# Patient Record
Sex: MALE | Race: WHITE | NOT HISPANIC OR LATINO | Employment: FULL TIME | URBAN - METROPOLITAN AREA
[De-identification: names, ages, dates, MRNs, and addresses within clinical notes are randomized per-mention and may not be internally consistent; named-entity substitution may affect disease eponyms.]

---

## 2022-05-09 ENCOUNTER — OCCUPATIONAL HEALTH (OUTPATIENT)
Dept: URGENT CARE | Facility: CLINIC | Age: 57
End: 2022-05-09

## 2022-05-09 DIAGNOSIS — Z13.9 ENCOUNTER FOR SCREENING: Primary | ICD-10-CM

## 2022-05-09 PROCEDURE — 80305 MEDTOX HAIR COLLECTION ONLY: ICD-10-PCS | Mod: S$GLB,,, | Performed by: PHYSICIAN ASSISTANT

## 2022-05-09 PROCEDURE — 80305 DRUG TEST PRSMV DIR OPT OBS: CPT | Mod: S$GLB,,, | Performed by: PHYSICIAN ASSISTANT

## 2022-12-20 ENCOUNTER — OFFICE VISIT (OUTPATIENT)
Dept: URGENT CARE | Facility: CLINIC | Age: 57
End: 2022-12-20
Payer: OTHER MISCELLANEOUS

## 2022-12-20 VITALS
DIASTOLIC BLOOD PRESSURE: 76 MMHG | WEIGHT: 205 LBS | TEMPERATURE: 99 F | OXYGEN SATURATION: 95 % | BODY MASS INDEX: 29.35 KG/M2 | HEIGHT: 70 IN | HEART RATE: 72 BPM | SYSTOLIC BLOOD PRESSURE: 143 MMHG
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
## 2022-12-20 DIAGNOSIS — Z02.6 ENCOUNTER RELATED TO WORKER'S COMPENSATION CLAIM: Primary | ICD-10-CM

## 2022-12-20 LAB — BREATH ALCOHOL: 0

## 2022-12-20 PROCEDURE — 99202 PR OFFICE/OUTPT VISIT, NEW, LEVL II, 15-29 MIN: ICD-10-PCS | Mod: S$GLB,,, | Performed by: STUDENT IN AN ORGANIZED HEALTH CARE EDUCATION/TRAINING PROGRAM

## 2022-12-20 PROCEDURE — 80305 OOH NON-DOT DRUG SCREEN: ICD-10-PCS | Mod: S$GLB,,, | Performed by: STUDENT IN AN ORGANIZED HEALTH CARE EDUCATION/TRAINING PROGRAM

## 2022-12-20 PROCEDURE — 82075 POCT BREATH ALCOHOL TEST: ICD-10-PCS | Mod: S$GLB,,, | Performed by: STUDENT IN AN ORGANIZED HEALTH CARE EDUCATION/TRAINING PROGRAM
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
## 2024-03-26 ENCOUNTER — OFFICE VISIT (OUTPATIENT)
Dept: URGENT CARE | Facility: CLINIC | Age: 59
End: 2024-03-26
Payer: OTHER MISCELLANEOUS

## 2024-03-26 ENCOUNTER — TELEPHONE (OUTPATIENT)
Dept: URGENT CARE | Facility: CLINIC | Age: 59
End: 2024-03-26
Payer: COMMERCIAL

## 2024-03-26 VITALS
TEMPERATURE: 99 F | OXYGEN SATURATION: 95 % | BODY MASS INDEX: 30.78 KG/M2 | HEART RATE: 77 BPM | SYSTOLIC BLOOD PRESSURE: 133 MMHG | HEIGHT: 70 IN | WEIGHT: 215 LBS | DIASTOLIC BLOOD PRESSURE: 99 MMHG

## 2024-03-26 DIAGNOSIS — S93.602A SPRAIN OF LEFT FOOT, INITIAL ENCOUNTER: Primary | ICD-10-CM

## 2024-03-26 DIAGNOSIS — S99.922A INJURY OF LEFT FOOT, INITIAL ENCOUNTER: ICD-10-CM

## 2024-03-26 DIAGNOSIS — Z02.6 ENCOUNTER RELATED TO WORKER'S COMPENSATION CLAIM: ICD-10-CM

## 2024-03-26 DIAGNOSIS — S93.402A SPRAIN OF LEFT ANKLE, UNSPECIFIED LIGAMENT, INITIAL ENCOUNTER: ICD-10-CM

## 2024-03-26 DIAGNOSIS — S99.912A INJURY OF LEFT ANKLE, INITIAL ENCOUNTER: ICD-10-CM

## 2024-03-26 PROCEDURE — 73630 X-RAY EXAM OF FOOT: CPT | Mod: LT,S$GLB,, | Performed by: STUDENT IN AN ORGANIZED HEALTH CARE EDUCATION/TRAINING PROGRAM

## 2024-03-26 PROCEDURE — 73610 X-RAY EXAM OF ANKLE: CPT | Mod: LT,S$GLB,, | Performed by: STUDENT IN AN ORGANIZED HEALTH CARE EDUCATION/TRAINING PROGRAM

## 2024-03-26 PROCEDURE — 99204 OFFICE O/P NEW MOD 45 MIN: CPT | Mod: S$GLB,,, | Performed by: NURSE PRACTITIONER

## 2024-03-26 NOTE — PROGRESS NOTES
Subjective:      Patient ID: Ernst Johnson is a 58 y.o. male.    Chief Complaint: Foot Injury    Patient's place of employment - [employer]  Patient's job title -   Date of injury - 3/26/24  Body part injured including left or right - LT Foot   Injury Mechanism - Twisting   What they were doing when they got hurt - Miss step in a hole on the ground bending foot   What they did immediately after - Alerted Supervisor and finished shift   Pain scale right now - 7/10    Yesterday, this  stepped into a hole with his L foot and hyper plantar flexed L foot. He did fall but denies other injury. He was able to complete his shift. He applied ice and took Tylenol for the pain with some relief. No previous foot or ankle injury. Has pain with weight bearing and ambulation. Had trouble sleeping last night due to the pain. This morning the pain is less than yesterday. MWT      Constitution: Positive for activity change. Negative for generalized weakness.   Musculoskeletal:  Positive for pain, trauma, joint pain, joint swelling, abnormal ROM of joint, pain with walking, muscle cramps and muscle ache.   Skin:  Negative for color change, abrasion, laceration, erythema and bruising.   Neurological:  Negative for loss of consciousness, numbness and tingling.   Psychiatric/Behavioral:  Positive for sleep disturbance.      Objective:     Physical Exam  Vitals and nursing note reviewed.   Constitutional:       General: He is not in acute distress.     Appearance: Normal appearance.   HENT:      Right Ear: External ear normal.      Left Ear: External ear normal.   Eyes:      Conjunctiva/sclera: Conjunctivae normal.   Cardiovascular:      Rate and Rhythm: Normal rate and regular rhythm.      Pulses: Normal pulses.      Heart sounds: Normal heart sounds.   Pulmonary:      Effort: Pulmonary effort is normal.      Breath sounds: Normal breath sounds.   Musculoskeletal:         General: Swelling and tenderness  present.      Left ankle: Swelling present. No deformity, ecchymosis or lacerations. No tenderness. Decreased range of motion. Anterior drawer test negative. Normal pulse.      Left foot: Decreased range of motion. Normal capillary refill. Swelling and tenderness present. No laceration. Normal pulse.        Feet:       Comments: Swelling present to L foot and lateral ankle. Most of pain is to foot/anterior aspect L ankle. Pain with weight bearing. Antalgic gait. ROM decreased. Most pain with plantar flexion. NV intact distally.   Skin:     General: Skin is warm and dry.      Capillary Refill: Capillary refill takes less than 2 seconds.      Findings: No bruising or erythema.   Neurological:      General: No focal deficit present.      Mental Status: He is alert and oriented to person, place, and time.   Psychiatric:         Mood and Affect: Mood normal.         Behavior: Behavior normal.         Thought Content: Thought content normal.         Judgment: Judgment normal.        Assessment:      1. Sprain of left foot, initial encounter    2. Injury of left ankle, initial encounter    3. Encounter related to worker's compensation claim    4. Injury of left foot, initial encounter    5. Sprain of left ankle, unspecified ligament, initial encounter      Plan:   I have independently reviewed the x-rays of the left ankle and foot.  Degenerative changes seen.  No acute fracture seen.  Patient understands this is a preliminary reading.  I will call him with the radiologist's report once available.  Pt reports he is allergic to Aspirin and cannot take NSAIDS. Says he can only take Tylenol. Therefore, he will take OTC Tylenol per label directions. Also will do RICE therapy and will reassess on Monday.  He will wear the walker boot for stability and support of the ankle and foot.     Patient Instructions: Apply ice 24-48 hours then apply heat/warm soaks, Elevated affected area, Use splint as directed, Attention not to  aggravate affected area   Restrictions: Home today, Sit down work only, No Prolonged standing/walking, No driving company vehicles  Follow up in about 6 days (around 4/1/2024).    I called pt with foot and ankle x-ray results which show no fractures.    I spent a total of 40 minutes on the day of the visit.This includes face to face time and non-face to face time preparing to see the patient (eg, review of tests), obtaining and/or reviewing separately obtained history, documenting clinical information in the electronic or other health record, independently interpreting results and communicating results to the patient/family/caregiver, or care coordinator.

## 2024-03-26 NOTE — LETTER
[facility]  [address] [address]  Phone: [phone]  Fax: [phone]  [facility]: [email or web address]     Name: Ernst Johnson  Injury Date: 03/25/2024   Employee ID: [id number] Date of First Treatment: 03/26/2024   Company: [employer]      Appointment Time:  Arrived: 9:25 AM    Provider: Elvi Nobles NP Time Out:11:22 AM      Office Treatment:   1. Sprain of left foot, initial encounter    2. Injury of left ankle, initial encounter    3. Encounter related to worker's compensation claim    4. Injury of left foot, initial encounter    5. Sprain of left ankle, unspecified ligament, initial encounter          Patient Instructions: Apply ice 24-48 hours then apply heat/warm soaks, Elevated affected area, Use splint as directed, Attention not to aggravate affected area      Restrictions: Home today, Sit down work only, No Prolonged standing/walking, No driving company vehicles     Return Appointment: 4/1/2024 at 9:00 AM CONSTANCE

## 2024-04-01 ENCOUNTER — OFFICE VISIT (OUTPATIENT)
Dept: URGENT CARE | Facility: CLINIC | Age: 59
End: 2024-04-01
Payer: OTHER MISCELLANEOUS

## 2024-04-01 VITALS
SYSTOLIC BLOOD PRESSURE: 140 MMHG | HEIGHT: 70 IN | DIASTOLIC BLOOD PRESSURE: 94 MMHG | RESPIRATION RATE: 18 BRPM | WEIGHT: 215 LBS | HEART RATE: 69 BPM | OXYGEN SATURATION: 97 % | BODY MASS INDEX: 30.78 KG/M2

## 2024-04-01 DIAGNOSIS — Z02.6 ENCOUNTER RELATED TO WORKER'S COMPENSATION CLAIM: ICD-10-CM

## 2024-04-01 DIAGNOSIS — S93.602A SPRAIN OF LEFT FOOT, INITIAL ENCOUNTER: Primary | ICD-10-CM

## 2024-04-01 DIAGNOSIS — S93.402A SPRAIN OF LEFT ANKLE, UNSPECIFIED LIGAMENT, INITIAL ENCOUNTER: ICD-10-CM

## 2024-04-01 PROCEDURE — 99213 OFFICE O/P EST LOW 20 MIN: CPT | Mod: S$GLB,,, | Performed by: NURSE PRACTITIONER

## 2024-04-01 NOTE — PROGRESS NOTES
Subjective:      Patient ID: Ernst Johnson is a 58 y.o. male.    Chief Complaint: Foot Injury    Patient's place of employment - [employer]  Patient's job title -   Date of Injury - 03/26/2024  Body part injured - LT Foot  Current work status per last visit - Not Working   Improved, same, or worse - Same  Pain Scale right now (1-10) -  7/10  SB.    Pt states that the LT foot has stopped throbbing, but still having the swelling.  Pt has been elevating and resting the LT foot.     Patient reports he has not been having any pain as long as he wears the boot.  When he takes the boot off and attempts to ambulate he does have pain to anterior foot/ankle.  Says the 1st couple of nights were very painful.  However, he has not required any Tylenol or other analgesics since last visit.  He has been resting, elevating and applying ice to ankle.  Has not been working. No LD available. Reports that swelling has decreased somewhat.  He does continue to have bruising.  Also reports popping that he both feels and hears. MWT        Constitution: Positive for activity change. Negative for generalized weakness.   Musculoskeletal:  Positive for pain, trauma, joint pain, joint swelling, abnormal ROM of joint, pain with walking, muscle cramps and muscle ache.   Skin:  Positive for bruising. Negative for color change, abrasion, laceration and erythema.   Neurological:  Negative for loss of consciousness, numbness and tingling.     Objective:     Physical Exam  Constitutional:       Appearance: Normal appearance.   HENT:      Right Ear: External ear normal.      Left Ear: External ear normal.   Eyes:      Conjunctiva/sclera: Conjunctivae normal.   Cardiovascular:      Pulses: Normal pulses.   Pulmonary:      Effort: Pulmonary effort is normal.   Musculoskeletal:         General: Swelling and tenderness present.      Left ankle: Swelling present. No ecchymosis. Tenderness present over the lateral malleolus. Decreased range of  motion. Anterior drawer test negative. Normal pulse.      Left Achilles Tendon: Tenderness present. No defects. Cardona's test negative.      Left foot: Decreased range of motion. Normal capillary refill. Swelling and tenderness present. Normal pulse.        Feet:       Comments: Mild swelling to left foot and ankle.  Ecchymosis to dorsum of left foot.  Pain primarily to left anterior foot.  Less pain to left lateral ankle.  Very mild soreness to Achilles area.  Pain with weight-bearing and ambulation.  Antalgic gait.  Limited range of motion left foot and ankle.  Neurovascular intact distally.  Negative Cardona test bilateral.   Skin:     General: Skin is warm and dry.      Capillary Refill: Capillary refill takes less than 2 seconds.      Findings: Bruising present. No erythema.   Neurological:      General: No focal deficit present.      Mental Status: He is alert and oriented to person, place, and time.   Psychiatric:         Mood and Affect: Mood normal.         Behavior: Behavior normal.         Thought Content: Thought content normal.         Judgment: Judgment normal.        Assessment:      1. Sprain of left foot, initial encounter    2. Sprain of left ankle, unspecified ligament, initial encounter    3. Encounter related to worker's compensation claim      Plan:   X-Ray Foot Complete 3 view Left    Result Date: 3/26/2024  EXAMINATION: XR FOOT COMPLETE 3 VIEW LEFT CLINICAL HISTORY: .  Unspecified injury of left foot, initial encounter TECHNIQUE: AP, lateral and oblique views of the left foot were performed. COMPARISON: None FINDINGS: Pes cavus deformity.  No acute fracture, dislocation, or osseous destruction.  Cartilage spaces appear relatively maintained.  Achilles enthesopathy.     As above. Electronically signed by: Hernán Neal Date:    03/26/2024 Time:    13:43    X-Ray Ankle Complete 3 View Left    Result Date: 3/26/2024  EXAMINATION: XR ANKLE COMPLETE 3 VIEW LEFT CLINICAL HISTORY: Unspecified  injury of left foot, initial encounter TECHNIQUE: AP, lateral and oblique views of the left ankle were performed. COMPARISON: None FINDINGS: Ankle mortise is symmetric.  No acute fracture, dislocation, or osseous destruction.  Achilles enthesopathy.  Mild diffuse soft tissue swelling of the ankle.     As above. Electronically signed by: Heránn Neal Date:    03/26/2024 Time:    13:39      Once again I reviewed the left ankle and foot x-rays that were done at the last visit.  No acute fractures or dislocation seen.  Patient continues to have some mild swelling and ecchymosis to left foot/ankle.  He will continue to wear the walker boot for support and stability.  I have encouraged him to start doing warm soaks and gentle range-of-motion stretches and attempt to wean out of the boot as tolerated.  He may take over-the-counter Tylenol per label directions as needed for pain.       Patient Instructions: Attention not to aggravate affected area, Daily home exercises/warm soaks, Elevated affected area, Use splint as directed (May alternate ice and heat 10 minutes as desired.)   Restrictions: Sit down work only, No Prolonged standing/walking, No driving company vehicles  Follow up in about 1 week (around 4/8/2024).    I spent a total of 25 minutes on the day of the visit.This includes face to face time and non-face to face time preparing to see the patient (eg, review of tests), obtaining and/or reviewing separately obtained history, documenting clinical information in the electronic or other health record, independently interpreting results and communicating results to the patient/family/caregiver, or care coordinator.

## 2024-04-01 NOTE — LETTER
[facility]  [address] [address]  Phone: [phone]  Fax: [phone]  [facility]: [email or web address]     Name: Ernst Johnson  Injury Date: 03/25/2024   Employee ID: [id number] Date of Treatment: 04/01/2024   Company: [employer]      Appointment Time: 09:00 AM Arrived: 8:35 AM    Provider: Elvi Nobles NP Time Out: 9:38 AM      Office Treatment:   1. Sprain of left foot, initial encounter    2. Sprain of left ankle, unspecified ligament, initial encounter    3. Encounter related to worker's compensation claim          Patient Instructions: Attention not to aggravate affected area, Daily home exercises/warm soaks, Elevated affected area, Use splint as directed (May alternate ice and heat 10 minutes as desired.)      Restrictions: Sit down work only, No Prolonged standing/walking, No driving company vehicles     Return Appointment: 4/8/2024 at 9:30 AM

## 2024-04-08 ENCOUNTER — OFFICE VISIT (OUTPATIENT)
Dept: URGENT CARE | Facility: CLINIC | Age: 59
End: 2024-04-08
Payer: OTHER MISCELLANEOUS

## 2024-04-08 VITALS
DIASTOLIC BLOOD PRESSURE: 81 MMHG | HEART RATE: 74 BPM | RESPIRATION RATE: 20 BRPM | HEIGHT: 70 IN | SYSTOLIC BLOOD PRESSURE: 146 MMHG | OXYGEN SATURATION: 97 % | WEIGHT: 215 LBS | BODY MASS INDEX: 30.78 KG/M2

## 2024-04-08 DIAGNOSIS — Z02.6 ENCOUNTER RELATED TO WORKER'S COMPENSATION CLAIM: Primary | ICD-10-CM

## 2024-04-08 DIAGNOSIS — S93.602D SPRAIN OF LEFT FOOT, SUBSEQUENT ENCOUNTER: ICD-10-CM

## 2024-04-08 DIAGNOSIS — S93.402D SPRAIN OF LEFT ANKLE, UNSPECIFIED LIGAMENT, SUBSEQUENT ENCOUNTER: ICD-10-CM

## 2024-04-08 PROCEDURE — 99213 OFFICE O/P EST LOW 20 MIN: CPT | Mod: S$GLB,,, | Performed by: NURSE PRACTITIONER

## 2024-04-08 NOTE — PROGRESS NOTES
Subjective:      Patient ID: Ernst Johnson is a 58 y.o. male.    Chief Complaint: Foot Injury (LT)    Patient's place of employment - [employer]  Patient's job title -   Date of Injury - 03-25-24  Body part injured - LT Foot  Current work status per last visit - Sit down work only, No Prolonged standing/walking, No driving company vehicles  Improved, same, or worse - Improved until took Walking Boot off then pain and swelling returned  Pain Scale right now (1-10) -  5/10    He tried weaning out of the boot but had immediate increase in pain and swelling that lasted for hours. Not taking any medication currently. Has pain with he lies on his side in the bed. Says he can only lie supine with foot elevated. He has been wearing the boot, applying ice and elevating, and soaking in warm water doing range-of-motion stretches.  Says the swelling has decreased. MWT    Foot Injury   Pertinent negatives include no numbness.       Musculoskeletal:  Positive for pain, joint pain, joint swelling, abnormal ROM of joint, pain with walking and muscle ache. Negative for trauma and muscle cramps.   Skin:  Positive for bruising.   Neurological:  Negative for numbness and tingling.     Objective:     Physical Exam  Vitals and nursing note reviewed.   Constitutional:       General: He is not in acute distress.     Appearance: Normal appearance.   HENT:      Right Ear: External ear normal.      Left Ear: External ear normal.   Eyes:      Conjunctiva/sclera: Conjunctivae normal.   Cardiovascular:      Pulses: Normal pulses.   Pulmonary:      Effort: Pulmonary effort is normal.   Musculoskeletal:         General: Swelling, tenderness and deformity present.      Left foot: Decreased range of motion. Normal capillary refill. Swelling, deformity and tenderness present. Normal pulse.        Feet:       Comments: Continues to have swelling and bruising to L foot. No pain to medial or lateral ankle. Pain with weight bearing and  ambulation.  Pes Cavas deformity.   Skin:     General: Skin is warm and dry.      Capillary Refill: Capillary refill takes less than 2 seconds.   Neurological:      General: No focal deficit present.      Mental Status: He is alert and oriented to person, place, and time.   Psychiatric:         Mood and Affect: Mood normal.         Behavior: Behavior normal.         Thought Content: Thought content normal.         Judgment: Judgment normal.        Assessment:      1. Encounter related to worker's compensation claim    2. Sprain of left foot, subsequent encounter    3. Sprain of left ankle, unspecified ligament, subsequent encounter      Plan:   Mr. Johnson continues to have swelling and pain to L foot. No ankle pain today. He cannot take NSAIDS and has not been taking any Tylenol recently. Very little improvement. Did not tolerate weaning out of the boot even for a brief period of time. 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   Patient Instructions: Attention not to aggravate affected area, Daily home exercises/warm soaks, Elevated affected area, Use splint as directed (May alternate ice and heat 10 min.)   Restrictions: Sit down work only, No Prolonged standing/walking, No driving company vehicles  Follow up in about 1 week (around 4/15/2024).    I spent a total of 25 minutes on the day of the visit.This includes face to face time and non-face to face time preparing to see the patient (eg, review of tests), obtaining and/or reviewing separately obtained history, documenting clinical information in the electronic or other health record, independently interpreting results and communicating results to the patient/family/caregiver, or care coordinator.

## 2024-04-08 NOTE — LETTER
[facility]  [address] [address]  Phone: [phone]  Fax: [phone]  [facility]: [email or web address]     Name: Ernst Johnson  Injury Date: 03/25/2024   Employee ID: [id number] Date of Treatment: 04/08/2024   Company: [employer]      Appointment Time: 09:30 AM Arrived: 9:08 AM    Provider: Evli Nobles NP Time Out:10:59 AM      Office Treatment:   1. Encounter related to worker's compensation claim    2. Sprain of left foot, subsequent encounter    3. Sprain of left ankle, unspecified ligament, subsequent encounter          Patient Instructions: Attention not to aggravate affected area, Daily home exercises/warm soaks, Elevated affected area, Use splint as directed (May alternate ice and heat 10 min.)      Restrictions: Sit down work only, No Prolonged standing/walking, No driving company vehicles     Return Appointment: 4/15/2024 at 9:00 AM DHARA

## 2024-04-15 ENCOUNTER — OFFICE VISIT (OUTPATIENT)
Dept: URGENT CARE | Facility: CLINIC | Age: 59
End: 2024-04-15
Payer: OTHER MISCELLANEOUS

## 2024-04-15 VITALS
OXYGEN SATURATION: 97 % | SYSTOLIC BLOOD PRESSURE: 134 MMHG | DIASTOLIC BLOOD PRESSURE: 88 MMHG | BODY MASS INDEX: 30.78 KG/M2 | HEIGHT: 70 IN | HEART RATE: 63 BPM | WEIGHT: 215 LBS | RESPIRATION RATE: 20 BRPM

## 2024-04-15 DIAGNOSIS — S93.402D SPRAIN OF LEFT ANKLE, UNSPECIFIED LIGAMENT, SUBSEQUENT ENCOUNTER: ICD-10-CM

## 2024-04-15 DIAGNOSIS — S93.602D SPRAIN OF LEFT FOOT, SUBSEQUENT ENCOUNTER: Primary | ICD-10-CM

## 2024-04-15 DIAGNOSIS — Z02.6 ENCOUNTER RELATED TO WORKER'S COMPENSATION CLAIM: ICD-10-CM

## 2024-04-15 PROCEDURE — 99214 OFFICE O/P EST MOD 30 MIN: CPT | Mod: S$GLB,,, | Performed by: NURSE PRACTITIONER

## 2024-04-15 NOTE — PROGRESS NOTES
Subjective:      Patient ID: Ernst Johnson is a 58 y.o. male.    Chief Complaint: Foot Injury    Patient's place of employment - [employer]  Patient's job title -   Date of Injury - 03-25-24  Body part injured - LT Foot  Current work status per last visit - Sit down work only, No Prolonged standing/walking, No driving company vehicles  Improved, same, or worse - Improving   Pain Scale right now (1-10) -  1/10  SB.    Pt states that he has been trying to wear a regular shoe, 20 minute increments, at a time. SB    Patient is attempting to wean out of the boot.  He continues to have swelling to the left foot.  He has been soaking and doing range-of-motion exercises.  He says the swelling has decreased as well as the pain.  However, he has not been able to tolerate being out of the boot for long periods of time.  He has not taking any medication for the left foot.  He has not been working since there is no light duty available.MWT            Musculoskeletal:  Positive for pain, joint pain, abnormal ROM of joint and muscle ache. Negative for trauma, joint swelling, pain with walking and muscle cramps.   Skin:  Positive for bruising.   Neurological:  Negative for numbness and tingling.     Objective:     Physical Exam  Vitals and nursing note reviewed.   Constitutional:       General: He is not in acute distress.     Appearance: Normal appearance.   HENT:      Right Ear: External ear normal.      Left Ear: External ear normal.   Eyes:      Conjunctiva/sclera: Conjunctivae normal.   Cardiovascular:      Pulses: Normal pulses.   Pulmonary:      Effort: Pulmonary effort is normal.   Musculoskeletal:         General: Swelling and tenderness present.      Left foot: Decreased range of motion. Normal capillary refill. Swelling, deformity and tenderness present. Normal pulse.        Feet:       Comments: Swelling and ecchymosis have decreased.  However, he still does have noticeable swelling to left anterior foot.   Tender to palpation to left anterior foot and also left lateral ankle. Ecchymosis has decreased. Continues to have pain with ambulation, antalgic gait.  Pes Cavas deformity.   Skin:     General: Skin is warm and dry.      Capillary Refill: Capillary refill takes less than 2 seconds.   Neurological:      General: No focal deficit present.      Mental Status: He is alert and oriented to person, place, and time.   Psychiatric:         Mood and Affect: Mood normal.         Behavior: Behavior normal.         Thought Content: Thought content normal.         Judgment: Judgment normal.        Assessment:      1. Sprain of left foot, subsequent encounter    2. Sprain of left ankle, unspecified ligament, subsequent encounter    3. Encounter related to worker's compensation claim      Plan:     Patient is slowly improving.  However, he continues to have significant swelling to left foot.  Also pain with weight bearing and ambulation. He has been attempting to wean out of the splint but only has been able to do so for short periods of time. Therefore, I have ordered physical therapy to the left foot and ankle.     Patient Instructions: Attention not to aggravate affected area, Daily home exercises/warm soaks, Use splint as directed, PT to be scheduled once authorized, Elevated affected area (May alternate ice and heat 10 min.)   Restrictions: Sit down work only, No Prolonged standing/walking, No driving company vehicles  No follow-ups on file.  Follow up 4/24/24.    I spent a total of 30 minutes on the day of the visit.This includes face to face time and non-face to face time preparing to see the patient (eg, review of tests), obtaining and/or reviewing separately obtained history, documenting clinical information in the electronic or other health record, independently interpreting results and communicating results to the patient/family/caregiver, or care coordinator.

## 2024-04-15 NOTE — LETTER
[facility]  [address] [address]  Phone: [phone]  Fax: [phone]  [facility]: [id number]    Pt Name: Ernst Johnson  Injury Date: 03/25/2024   Employee ID: [id number] Date of Treatment: 04/15/2024   Company: [employer]      Appointment Time: 09:00 AM Arrived: 8:40 AM    Provider: Elvi Nobles NP Time Out: 9:51 AM      Office Treatment:   1. Encounter related to worker's compensation claim    2. Sprain of left foot, subsequent encounter    3. Sprain of left ankle, unspecified ligament, subsequent encounter          Patient Instructions: Attention not to aggravate affected area, Daily home exercises/warm soaks, Use splint as directed, PT to be scheduled once authorized (May alternate ice and heat 10 min.)      Restrictions: Sit down work only, No Prolonged standing/walking, No driving company vehicles     Return Appointment: 4/24/2024 at 9:30 AM CONSTANCE

## 2024-04-24 ENCOUNTER — OFFICE VISIT (OUTPATIENT)
Dept: URGENT CARE | Facility: CLINIC | Age: 59
End: 2024-04-24
Payer: OTHER MISCELLANEOUS

## 2024-04-24 VITALS
BODY MASS INDEX: 30.78 KG/M2 | WEIGHT: 215 LBS | OXYGEN SATURATION: 97 % | DIASTOLIC BLOOD PRESSURE: 98 MMHG | RESPIRATION RATE: 16 BRPM | SYSTOLIC BLOOD PRESSURE: 128 MMHG | HEIGHT: 70 IN | HEART RATE: 86 BPM

## 2024-04-24 DIAGNOSIS — S93.402D SPRAIN OF LEFT ANKLE, UNSPECIFIED LIGAMENT, SUBSEQUENT ENCOUNTER: ICD-10-CM

## 2024-04-24 DIAGNOSIS — Z02.6 ENCOUNTER RELATED TO WORKER'S COMPENSATION CLAIM: ICD-10-CM

## 2024-04-24 DIAGNOSIS — S93.602D SPRAIN OF LEFT FOOT, SUBSEQUENT ENCOUNTER: Primary | ICD-10-CM

## 2024-04-24 PROCEDURE — 99213 OFFICE O/P EST LOW 20 MIN: CPT | Mod: S$GLB,,, | Performed by: NURSE PRACTITIONER

## 2024-04-24 NOTE — LETTER
[facility]  [address] [address]  Phone: [phone]  Fax: [phone]  [facility]: [email or web address]     Name: Ernst Johnson  Injury Date: 03/25/2024   Employee ID: [id number] Date of Treatment: 04/24/2024   Company: [employer]      Appointment Time: 09:15 AM Arrived: 9:06am   Provider: Eliv Nobles NP Time Out:10:25 am     Office Treatment:   1. Sprain of left foot, subsequent encounter    2. Sprain of left ankle, unspecified ligament, subsequent encounter    3. Encounter related to worker's compensation claim          Patient Instructions: Attention not to aggravate affected area, Daily home exercises/warm soaks    Restrictions: Regular Duty     Return Appointment: 5/1/2024 at 10:00 am

## 2024-04-24 NOTE — PROGRESS NOTES
Subjective:      Patient ID: Ernst Johnson is a 58 y.o. male.    Chief Complaint: Foot Pain (LT)    Patient's place of employment - [employer]  Patient's job title -   Date of Injury - 03/25/2024  Body part injured - lt foot  Current work status per last visit - Sit down work only, No Prolonged standing/walking, No driving company vehicles  Improved, same, or worse - improved  Pain Scale right now (1-10) -  1/10    No longer wearing boot. Has tie up tennis shoes on today.  Pain level has decreased to minimal level. Doing the warm soaks and range-of-motion stretches as instructed. Continues to have some mild swelling.  I received a message in epic saying that physical therapy was denied.  Case is under investigation.  Patient reports that someone came to his house to interview him.  Says he needs to get back to work because worker's comp will no longer approve his visits. MWT    Foot Pain  Associated symptoms include arthralgias and joint swelling. Pertinent negatives include no myalgias or numbness.       Musculoskeletal:  Positive for joint pain and joint swelling. Negative for abnormal ROM of joint, arthritis, gout, pain with walking, muscle cramps and muscle ache.   Skin:  Negative for erythema and bruising.   Neurological:  Negative for numbness and tingling.     Objective:     Physical Exam  Constitutional:       General: He is not in acute distress.     Appearance: Normal appearance.   Eyes:      Conjunctiva/sclera: Conjunctivae normal.   Cardiovascular:      Pulses: Normal pulses.   Pulmonary:      Effort: Pulmonary effort is normal.   Musculoskeletal:         General: Swelling and tenderness present.      Left foot: Normal range of motion. Swelling, deformity and tenderness present. Normal pulse.        Feet:       Comments: Ecchymosis has pretty much resolved.  Continues to have mild swelling to left foot.  Very mild tender to palpation to left anterior foot.  No significant pain to left lateral  ankle.  Ambulation has improved. Pes Cavas deformity.   Skin:     General: Skin is warm and dry.      Findings: No bruising or erythema.   Neurological:      General: No focal deficit present.      Mental Status: He is alert and oriented to person, place, and time.   Psychiatric:         Mood and Affect: Mood normal.         Behavior: Behavior normal.         Thought Content: Thought content normal.         Judgment: Judgment normal.        Assessment:      1. Sprain of left foot, subsequent encounter    2. Sprain of left ankle, unspecified ligament, subsequent encounter    3. Encounter related to worker's compensation claim      Plan:   Physical therapy has been denied. Pt is feeling better, less pain,  and ROM is slowly improving with the home exercises. He has been compliant with the warm soaks and ROM stretching. He will RTW on a trial basis. Both the patient and I think he will be able to tolerate his regular work activities. Will re-assess at next visit in a week for possible discharge.       Patient Instructions: Attention not to aggravate affected area, Daily home exercises/warm soaks   Restrictions: Regular Duty  Follow up in about 1 week (around 5/1/2024).    I spent a total of 25 minutes on the day of the visit.This includes face to face time and non-face to face time preparing to see the patient (eg, review of tests), obtaining and/or reviewing separately obtained history, documenting clinical information in the electronic or other health record, independently interpreting results and communicating results to the patient/family/caregiver, or care coordinator.

## 2024-05-14 ENCOUNTER — OFFICE VISIT (OUTPATIENT)
Dept: URGENT CARE | Facility: CLINIC | Age: 59
End: 2024-05-14
Payer: COMMERCIAL

## 2024-05-14 VITALS
SYSTOLIC BLOOD PRESSURE: 123 MMHG | HEART RATE: 88 BPM | RESPIRATION RATE: 17 BRPM | WEIGHT: 215 LBS | HEIGHT: 70 IN | OXYGEN SATURATION: 97 % | DIASTOLIC BLOOD PRESSURE: 91 MMHG | BODY MASS INDEX: 30.78 KG/M2

## 2024-05-14 DIAGNOSIS — Z02.6 ENCOUNTER RELATED TO WORKER'S COMPENSATION CLAIM: Primary | ICD-10-CM

## 2024-05-14 DIAGNOSIS — S93.602D SPRAIN OF LEFT FOOT, SUBSEQUENT ENCOUNTER: ICD-10-CM

## 2024-05-14 PROCEDURE — 99213 OFFICE O/P EST LOW 20 MIN: CPT | Mod: S$GLB,,, | Performed by: FAMILY MEDICINE

## 2024-05-14 NOTE — PROGRESS NOTES
Subjective:      Patient ID: Ernst Johnson is a 58 y.o. male.    Chief Complaint: Foot Injury    Patient's place of employment - [employer]  Patient's job title -   Date of Injury - 03/25/2024  Body part injured - Lt Foot  Current work status per last visit - Regular Duty  Improved, same, or worse - Improved  Pain Scale right now (1-10) -  1/10  SB.    Pt her for follow up of left foot injury, now completely pain free,  Initial injury  3/25, with left foot sprain and pain, that has resolved  Walking without pain not taking any meds      Musculoskeletal:  Negative for joint pain, joint swelling, abnormal ROM of joint, arthritis, gout, pain with walking, muscle cramps and muscle ache.   Skin:  Negative for erythema and bruising.   Neurological:  Negative for numbness and tingling.     Objective:     Physical Exam  Vitals and nursing note reviewed.   Constitutional:       General: He is not in acute distress.     Appearance: Normal appearance. He is well-developed. He is not diaphoretic.   HENT:      Head: Normocephalic and atraumatic.      Nose: Nose normal.      Mouth/Throat:      Mouth: Mucous membranes are moist.      Pharynx: Oropharynx is clear.   Eyes:      General: Lids are normal.      Conjunctiva/sclera: Conjunctivae normal.   Neck:      Trachea: Trachea and phonation normal.   Cardiovascular:      Rate and Rhythm: Normal rate and regular rhythm.      Pulses: Normal pulses.      Heart sounds: Normal heart sounds.   Pulmonary:      Effort: Pulmonary effort is normal.      Breath sounds: Normal breath sounds.   Abdominal:      General: Bowel sounds are normal. There is no abdominal bruit.      Palpations: Abdomen is soft. There is no mass or pulsatile mass.   Musculoskeletal:         General: No swelling, tenderness, deformity or signs of injury.      Cervical back: Full passive range of motion without pain, normal range of motion and neck supple.      Right lower leg: No edema.      Left lower leg:  No edema.      Comments: Left foot no swelling or tenderness  Able to walk without any limp     Skin:     General: Skin is warm and dry.      Findings: No erythema.   Neurological:      Mental Status: He is alert and oriented to person, place, and time.      Sensory: No sensory deficit.      Deep Tendon Reflexes: Reflexes are normal and symmetric.   Psychiatric:         Speech: Speech normal.         Behavior: Behavior normal. Behavior is cooperative.         Thought Content: Thought content normal.         Judgment: Judgment normal.        Assessment:      1. Encounter related to worker's compensation claim    2. Sprain of left foot, subsequent encounter      Plan:     Reassurance     Released to regular duty           No follow-ups on file.

## 2024-05-14 NOTE — LETTER
[facility]  [address] [address]  Phone: [phone]  Fax: [phone]  [facility]: [id number]    Pt Name: Ernst Johnson  Injury Date: 03/25/2024   Employee ID: [id number] Date of Treatment: 05/14/2024   Company: [employer]      Appointment Time: 10:15 AM Arrived: 10:10 AM   Provider: Svetlana Ken MD Time Out:10:52 AM     Office Treatment:   1. Encounter related to worker's compensation claim    2. Sprain of left foot, subsequent encounter               Restrictions: Regular Duty     Return Appointment: PRN    Return if symptoms worsen.    KW